# Patient Record
Sex: MALE | Race: BLACK OR AFRICAN AMERICAN | NOT HISPANIC OR LATINO | Employment: FULL TIME | RURAL
[De-identification: names, ages, dates, MRNs, and addresses within clinical notes are randomized per-mention and may not be internally consistent; named-entity substitution may affect disease eponyms.]

---

## 2021-04-03 ENCOUNTER — HISTORICAL (OUTPATIENT)
Dept: ADMINISTRATIVE | Facility: HOSPITAL | Age: 26
End: 2021-04-03

## 2021-04-03 ENCOUNTER — HOSPITAL ENCOUNTER (EMERGENCY)
Facility: HOSPITAL | Age: 26
Discharge: HOME | End: 2021-04-03
Attending: EMERGENCY MEDICINE
Payer: COMMERCIAL

## 2021-04-03 VITALS
WEIGHT: 145 LBS | HEART RATE: 66 BPM | RESPIRATION RATE: 13 BRPM | BODY MASS INDEX: 21.98 KG/M2 | OXYGEN SATURATION: 100 % | HEIGHT: 68 IN | DIASTOLIC BLOOD PRESSURE: 61 MMHG | SYSTOLIC BLOOD PRESSURE: 102 MMHG | TEMPERATURE: 97 F

## 2021-04-03 DIAGNOSIS — S43.014A ANTERIOR DISLOCATION OF RIGHT SHOULDER, INITIAL ENCOUNTER: Primary | ICD-10-CM

## 2021-04-03 DIAGNOSIS — R52 PAIN: ICD-10-CM

## 2021-04-03 PROCEDURE — 96375 TX/PRO/DX INJ NEW DRUG ADDON: CPT

## 2021-04-03 PROCEDURE — 99283 EMERGENCY DEPT VISIT LOW MDM: CPT | Mod: 25

## 2021-04-03 PROCEDURE — 25000003 PHARM REV CODE 250

## 2021-04-03 PROCEDURE — 99284 EMERGENCY DEPT VISIT MOD MDM: CPT | Mod: 25,,, | Performed by: EMERGENCY MEDICINE

## 2021-04-03 PROCEDURE — 23650 CLTX SHO DSLC W/MNPJ WO ANES: CPT

## 2021-04-03 PROCEDURE — 63600175 PHARM REV CODE 636 W HCPCS: Performed by: EMERGENCY MEDICINE

## 2021-04-03 PROCEDURE — 23650 CLTX SHO DSLC W/MNPJ WO ANES: CPT | Mod: RT,,, | Performed by: EMERGENCY MEDICINE

## 2021-04-03 PROCEDURE — 96374 THER/PROPH/DIAG INJ IV PUSH: CPT

## 2021-04-03 PROCEDURE — 23650 PR CLOSED RX SHLDR DISLOCATION: ICD-10-PCS | Mod: RT,,, | Performed by: EMERGENCY MEDICINE

## 2021-04-03 PROCEDURE — 99284 PR EMERGENCY DEPT VISIT,LEVEL IV: ICD-10-PCS | Mod: 25,,, | Performed by: EMERGENCY MEDICINE

## 2021-04-03 PROCEDURE — 96361 HYDRATE IV INFUSION ADD-ON: CPT

## 2021-04-03 RX ORDER — MIDAZOLAM HYDROCHLORIDE 1 MG/ML
2 INJECTION INTRAMUSCULAR; INTRAVENOUS
Status: COMPLETED | OUTPATIENT
Start: 2021-04-03 | End: 2021-04-03

## 2021-04-03 RX ORDER — FENTANYL CITRATE 50 UG/ML
100 INJECTION, SOLUTION INTRAMUSCULAR; INTRAVENOUS
Status: COMPLETED | OUTPATIENT
Start: 2021-04-03 | End: 2021-04-03

## 2021-04-03 RX ORDER — MIDAZOLAM HYDROCHLORIDE 1 MG/ML
INJECTION INTRAMUSCULAR; INTRAVENOUS
Status: DISCONTINUED
Start: 2021-04-03 | End: 2021-04-03 | Stop reason: HOSPADM

## 2021-04-03 RX ORDER — SODIUM CHLORIDE 9 MG/ML
INJECTION, SOLUTION INTRAVENOUS
Status: COMPLETED
Start: 2021-04-03 | End: 2021-04-03

## 2021-04-03 RX ADMIN — SODIUM CHLORIDE 1000 ML: 9 INJECTION, SOLUTION INTRAVENOUS at 10:04

## 2021-04-03 RX ADMIN — MIDAZOLAM HYDROCHLORIDE 7 MG: 1 INJECTION, SOLUTION INTRAMUSCULAR; INTRAVENOUS at 10:04

## 2021-04-03 RX ADMIN — FENTANYL CITRATE 100 MCG: 50 INJECTION INTRAMUSCULAR; INTRAVENOUS at 10:04

## 2022-02-10 ENCOUNTER — HOSPITAL ENCOUNTER (EMERGENCY)
Facility: HOSPITAL | Age: 27
Discharge: HOME | End: 2022-02-10
Attending: EMERGENCY MEDICINE

## 2022-02-10 ENCOUNTER — HOSPITAL ENCOUNTER (EMERGENCY)
Facility: HOSPITAL | Age: 27
Discharge: HOME | End: 2022-02-10

## 2022-02-10 VITALS
SYSTOLIC BLOOD PRESSURE: 117 MMHG | HEART RATE: 58 BPM | WEIGHT: 130 LBS | HEIGHT: 68 IN | RESPIRATION RATE: 17 BRPM | BODY MASS INDEX: 19.7 KG/M2 | OXYGEN SATURATION: 100 % | DIASTOLIC BLOOD PRESSURE: 76 MMHG | TEMPERATURE: 98 F

## 2022-02-10 VITALS
HEART RATE: 60 BPM | SYSTOLIC BLOOD PRESSURE: 112 MMHG | TEMPERATURE: 98 F | OXYGEN SATURATION: 99 % | HEIGHT: 68 IN | RESPIRATION RATE: 16 BRPM | WEIGHT: 130 LBS | DIASTOLIC BLOOD PRESSURE: 66 MMHG | BODY MASS INDEX: 19.7 KG/M2

## 2022-02-10 DIAGNOSIS — M24.411 SHOULDER DISLOCATION, RECURRENT, RIGHT: ICD-10-CM

## 2022-02-10 DIAGNOSIS — S43.014A ANTERIOR DISLOCATION OF RIGHT SHOULDER, INITIAL ENCOUNTER: Primary | ICD-10-CM

## 2022-02-10 PROCEDURE — 63600175 PHARM REV CODE 636 W HCPCS: Performed by: NURSE PRACTITIONER

## 2022-02-10 PROCEDURE — 96361 HYDRATE IV INFUSION ADD-ON: CPT

## 2022-02-10 PROCEDURE — 23650 PR CLOSED RX SHLDR DISLOCATION: ICD-10-PCS | Mod: RT,,, | Performed by: EMERGENCY MEDICINE

## 2022-02-10 PROCEDURE — 99285 EMERGENCY DEPT VISIT HI MDM: CPT | Mod: 25

## 2022-02-10 PROCEDURE — 96374 THER/PROPH/DIAG INJ IV PUSH: CPT

## 2022-02-10 PROCEDURE — 23650 CLTX SHO DSLC W/MNPJ WO ANES: CPT | Mod: 52,RT,, | Performed by: NURSE PRACTITIONER

## 2022-02-10 PROCEDURE — 63600175 PHARM REV CODE 636 W HCPCS: Performed by: EMERGENCY MEDICINE

## 2022-02-10 PROCEDURE — 99283 EMERGENCY DEPT VISIT LOW MDM: CPT | Mod: 57,,, | Performed by: EMERGENCY MEDICINE

## 2022-02-10 PROCEDURE — 99284 EMERGENCY DEPT VISIT MOD MDM: CPT | Mod: 25,,, | Performed by: NURSE PRACTITIONER

## 2022-02-10 PROCEDURE — 99284 PR EMERGENCY DEPT VISIT,LEVEL IV: ICD-10-PCS | Mod: 25,,, | Performed by: NURSE PRACTITIONER

## 2022-02-10 PROCEDURE — 99283 PR EMERGENCY DEPT VISIT,LEVEL III: ICD-10-PCS | Mod: 57,,, | Performed by: EMERGENCY MEDICINE

## 2022-02-10 PROCEDURE — 96375 TX/PRO/DX INJ NEW DRUG ADDON: CPT

## 2022-02-10 PROCEDURE — 23650 PR CLOSED RX SHLDR DISLOCATION: ICD-10-PCS | Mod: 52,RT,, | Performed by: NURSE PRACTITIONER

## 2022-02-10 PROCEDURE — 25000003 PHARM REV CODE 250: Performed by: EMERGENCY MEDICINE

## 2022-02-10 PROCEDURE — 99284 EMERGENCY DEPT VISIT MOD MDM: CPT | Mod: 25

## 2022-02-10 PROCEDURE — 23650 CLTX SHO DSLC W/MNPJ WO ANES: CPT | Mod: RT,,, | Performed by: EMERGENCY MEDICINE

## 2022-02-10 RX ORDER — FENTANYL CITRATE 50 UG/ML
50 INJECTION, SOLUTION INTRAMUSCULAR; INTRAVENOUS
Status: COMPLETED | OUTPATIENT
Start: 2022-02-10 | End: 2022-02-10

## 2022-02-10 RX ORDER — MIDAZOLAM HYDROCHLORIDE 1 MG/ML
2 INJECTION INTRAMUSCULAR; INTRAVENOUS
Status: COMPLETED | OUTPATIENT
Start: 2022-02-10 | End: 2022-02-10

## 2022-02-10 RX ORDER — SODIUM CHLORIDE 9 MG/ML
INJECTION, SOLUTION INTRAVENOUS
Status: COMPLETED | OUTPATIENT
Start: 2022-02-10 | End: 2022-02-10

## 2022-02-10 RX ORDER — PROPOFOL 10 MG/ML
VIAL (ML) INTRAVENOUS CODE/TRAUMA/SEDATION MEDICATION
Status: COMPLETED | OUTPATIENT
Start: 2022-02-10 | End: 2022-02-10

## 2022-02-10 RX ORDER — PROPOFOL 10 MG/ML
200 VIAL (ML) INTRAVENOUS ONCE
Status: COMPLETED | OUTPATIENT
Start: 2022-02-10 | End: 2022-02-10

## 2022-02-10 RX ORDER — SODIUM CHLORIDE 9 MG/ML
INJECTION, SOLUTION INTRAVENOUS
Status: DISCONTINUED
Start: 2022-02-10 | End: 2022-02-10 | Stop reason: HOSPADM

## 2022-02-10 RX ADMIN — PROPOFOL 50 MG: 10 INJECTION, EMULSION INTRAVENOUS at 02:02

## 2022-02-10 RX ADMIN — FENTANYL CITRATE 50 MCG: 50 INJECTION INTRAMUSCULAR; INTRAVENOUS at 11:02

## 2022-02-10 RX ADMIN — SODIUM CHLORIDE 1000 ML: 9 INJECTION, SOLUTION INTRAVENOUS at 02:02

## 2022-02-10 RX ADMIN — PROPOFOL 20 MG: 10 INJECTION, EMULSION INTRAVENOUS at 02:02

## 2022-02-10 RX ADMIN — MIDAZOLAM 2 MG: 1 INJECTION INTRAMUSCULAR; INTRAVENOUS at 11:02

## 2022-02-10 RX ADMIN — PROPOFOL 100 MG: 10 INJECTION, EMULSION INTRAVENOUS at 02:02

## 2022-02-10 RX ADMIN — PROPOFOL 30 MG: 10 INJECTION, EMULSION INTRAVENOUS at 02:02

## 2022-02-10 NOTE — ED TRIAGE NOTES
Transfer from [facility] with right shoulder dislocation that they were unable to relocate there.  Fentanyl 200mcg, and versed 4mg admin. pta

## 2022-02-10 NOTE — ED PROVIDER NOTES
Encounter Date: 2/10/2022       History     Chief Complaint   Patient presents with    Shoulder Injury     Reports right shoulder pain onset just PTA. Was shoveling when pain began. Recent hx of dislocation. Multiple dislocations over the past year. Has not seen ortho. Denies paresthesia of the RUE.        Review of patient's allergies indicates:  No Known Allergies  History reviewed. No pertinent past medical history.  History reviewed. No pertinent surgical history.  History reviewed. No pertinent family history.  Social History     Tobacco Use    Smoking status: Current Every Day Smoker     Types: Cigarettes    Smokeless tobacco: Never Used   Substance Use Topics    Alcohol use: Never    Drug use: Never     Review of Systems   Respiratory: Negative.    Cardiovascular: Negative.    Musculoskeletal: Positive for arthralgias (right shoulder).   Neurological: Negative.        Physical Exam     Initial Vitals [02/10/22 1034]   BP Pulse Resp Temp SpO2   127/71 79 18 98.2 °F (36.8 °C) 99 %      MAP       --         Physical Exam    Nursing note and vitals reviewed.  Constitutional: No distress.   HENT:   Head: Normocephalic and atraumatic.   Eyes: EOM are normal.   Neck: Neck supple.   Cardiovascular: Normal rate.   Pulmonary/Chest: No respiratory distress.   Musculoskeletal:      Cervical back: Neck supple.      Comments: Asymmetry of right shoulder. Questionable deformity. 3+ rp, cap refill brisk, sensorium intact.     Neurological: He is alert. GCS score is 15. GCS eye subscore is 4. GCS verbal subscore is 5. GCS motor subscore is 6.   Skin: Skin is warm and dry. Capillary refill takes less than 2 seconds.         Medical Screening Exam   See Full Note    ED Course   Procedural Sedation        Date/Time: 2/10/2022 11:15 AM  Performed by: DANNIE Peña  Authorized by: DANNIE Peña   Consent Done: Yes  Consent: Written consent obtained.  Risks and benefits: risks, benefits and alternatives were  "discussed  Consent given by: patient  Patient understanding: patient states understanding of the procedure being performed  Patient consent: the patient's understanding of the procedure matches consent given  Imaging studies: imaging studies available  Patient identity confirmed: name and   Time out: Immediately prior to procedure a "time out" was called to verify the correct patient, procedure, equipment, support staff and site/side marked as required.  ASA Class: Class 1 - Heathy patient. No medical history.  Mallampati Score: Class 2 - Visualization of the soft palate, fauces, and uvula.   Equipment: on cardiac monitor., on BP monitor., suction available., airway equipment available. and on supplemental oxygen.     Sedation type: moderate (conscious) sedation  (See MAR for exact dosages of medications).  Sedatives: midazolam  Analgesia: fentanyl  Sedation start date/time: 2/10/2022 11:31 AM  Sedation end date/time: 2/10/2022 11:39 AM  Total Sedation Time (min): 8  Complications: No complications.   Comments: Unsuccessful at reduction with moderate sedation  Patient/Family history of anesthesia or sedation complications: No      Labs Reviewed - No data to display       Imaging Results          X-ray Shoulder 2 or More Views Right (Final result)  Result time 02/10/22 11:04:08    Final result by Perry Oliver II, MD (02/10/22 11:04:08)                 Impression:      Shoulder dislocation as described above.      Electronically signed by: Perry Oliver  Date:    02/10/2022  Time:    11:04             Narrative:    EXAMINATION:  XR SHOULDER COMPLETE 2 OR MORE VIEWS RIGHT    CLINICAL HISTORY:  right shoulder pain;    COMPARISON:  3 April 2021    FINDINGS:  No evidence of fracture seen.  Humerus is dislocated anteriorly to the glenoid.  No degenerative change is present.  No soft tissue abnormality is seen.                                 Medications   fentaNYL injection 50 mcg (50 mcg Intravenous Given " 2/10/22 1117)   midazolam (VERSED) 1 mg/mL injection 2 mg (2 mg Intravenous Given by Other 2/10/22 1131)   fentaNYL injection 50 mcg (50 mcg Intravenous Given 2/10/22 1131)   midazolam (VERSED) 1 mg/mL injection 2 mg (2 mg Intravenous Given 2/10/22 1138)   fentaNYL injection 50 mcg (50 mcg Intravenous Given by Other 2/10/22 1139)   fentaNYL injection 50 mcg (50 mcg Intravenous Given 2/10/22 1145)                 ED Course as of 02/10/22 1235   Thu Feb 10, 2022   1120 Anterior shoulder dislocation on xray. Plan to give fentanyl for pain and reduce. [GM]   1213 Unsuccessful at shoulder reduction. Discussed with Dr Rosales. Advised transfer to [facility] ER. Dr Edmonds accepting. [GM]      ED Course User Index  [GM] DANNIE Peña          Clinical Impression:   Final diagnoses:  [S43.014A] Anterior dislocation of right shoulder, initial encounter (Primary)          ED Disposition Condition    Transfer to Another Facility Stable              DANNIE Peña  02/10/22 1235

## 2022-02-10 NOTE — ED PROVIDER NOTES
Encounter Date: 2/10/2022    SCRIBE #1 NOTE: I, Rip Morales, am scribing for, and in the presence of,  Dr. Joss Lopez. I have scribed the entire note.   SCRIBE #2 NOTE: I, Carmen, am scribing for, and in the presence of, Grey.     History     Chief Complaint   Patient presents with    Shoulder Injury     A 26 year old  male, with no known PMH, presents to the emergency department, via EMS from [facility], with complaints of right shoulder injury. Patient reports that this morning, February 10, 2022,  he was moving some lumber and his right shoulder popped out of place. Patient explains that he has a chronic reoccurrence of right shoulder dislocations (approximately 3-4x), but has not seen an orthopedist about this problem. Patient presented to [facility] ED this morning to have his right shoulder reduced. He was administered 200 mcg Fentanyl and 4 mg of versed; however, the right shoulder was unable to be reduced due to patient's pain. Upon presentation to [facility] ED, patient rates his pain as 9/10 on the pain scale; patient's right arm is in a sling. Patient denies any numbness or any other associated injuries/symptoms.    The history is provided by the patient. No  was used.     Review of patient's allergies indicates:  No Known Allergies  History reviewed. No pertinent past medical history.  History reviewed. No pertinent surgical history.  History reviewed. No pertinent family history.  Social History     Tobacco Use    Smoking status: Current Every Day Smoker     Types: Cigarettes    Smokeless tobacco: Never Used   Substance Use Topics    Alcohol use: Never    Drug use: Never     Review of Systems   Musculoskeletal: Positive for arthralgias (Right shoulder).   Neurological: Negative for numbness.   All other systems reviewed and are negative.      Physical Exam     Initial Vitals [02/10/22 1402]   BP Pulse Resp Temp SpO2   121/63 61 16 97.9 °F (36.6 °C) 100 %       MAP       --         Physical Exam    Nursing note and vitals reviewed.  Constitutional: He appears well-developed and well-nourished.   HENT:   Head: Normocephalic and atraumatic.   Eyes: Conjunctivae and EOM are normal. Pupils are equal, round, and reactive to light.   Neck: Neck supple.   Normal range of motion.  Cardiovascular: Normal rate, regular rhythm, normal heart sounds and intact distal pulses.   Pulmonary/Chest: Breath sounds normal. No respiratory distress.   Abdominal: Abdomen is soft. He exhibits no distension. There is no abdominal tenderness.   Musculoskeletal:      Right shoulder: Deformity and tenderness present. No crepitus. Normal pulse.      Cervical back: Normal range of motion and neck supple.      Comments: Patient's right arm is in a sling. Right shoulder appears to be anteriorly dislocated. Right shoulder is tender to palpation.     Neurological: He is alert and oriented to person, place, and time. GCS score is 15. GCS eye subscore is 4. GCS verbal subscore is 5. GCS motor subscore is 6.   Patient's right shoulder is neurovascularly intact.   Skin: Skin is warm and dry. Capillary refill takes less than 2 seconds. No rash noted.   Psychiatric: He has a normal mood and affect. Thought content normal.         ED Course   Orthopedic Injury    Date/Time: 2/10/2022 2:17 PM  Performed by: Joss Lopez MD  Authorized by: Janes Edmonds MD     Location procedure was performed:  [facility] [[email or web address] EMERGENCY DEPARTMENT  Assisting Provider:  Janes Edmonds MD  Consent Done?:  Yes  Universal Protocol:     Verbal consent obtained?: Yes      Written consent obtained?: Yes      Risks and benefits: Risks, benefits and alternatives were discussed      Consent given by:  Patient    Patient states understanding of procedure being performed: Yes      Patient's understanding of procedure matches consent: Yes      Procedure consent matches procedure scheduled: Yes      Relevant documents present and  verified: Yes      Site marked: Yes      Imaging studies available: Yes      Required items: Required blood products, implants, devices and special equipment avialable      Patient identity confirmed:   and name    Time Out: Immediately prior to the procedure a time out was called    Injury:     Injury location:  Shoulder    Location details:  Right shoulder    Injury type:  Dislocation    Dislocation type: anterior      Chronicity:  Recurrent    Hill-Sachs deformity?: No        Pre-procedure assessment:     Neurovascular status: Neurovascularly intact      Distal perfusion: normal      Neurological function: normal      Range of motion: reduced      Local anesthesia used?: No      Patient sedated?: Yes      ASA Class:  Class 1 - Heathy patient. No medical history.    Mallampati Score:  Class 2 - Visualization of the soft palate, fauces, and uvula.  Date/Time of last solid:  2/10/2022 6:00 AM    Date/Time of last fluid:  2/10/2022 6:00 AM    Patient/Family history of anesthesia or sedation complications: No      Sedation type: deep sedation      Sedation:  Propofol    Sedation start:  2/10/2022 2:47 PM    Sedation end:  2/10/2022 3:00 PM    Vital signs: Vital signs monitored during sedation        Selections made in this section will also lock the Injury type section above.:     Manipulation performed?: Yes      Reduction method:  External rotation (Longitudinal Traction)    Reduction method:  External rotation (Longitudinal Traction)    Reduction method:  External rotation (Longitudinal Traction)    Reduction method:  External rotation (Longitudinal Traction)    Reduction method:  External rotation (Longitudinal Traction)    Reduction method:  External rotation (Longitudinal Traction)    Reduction successful?: Yes      Confirmation: Reduction confirmed by x-ray      Immobilization:  Sling (Shoulder Immobilizer)    Complications: No      Specimens: No      Implants: No    Post-procedure assessment:      Neurovascular status: Neurovascularly intact      Distal perfusion: normal      Neurological function: normal      Range of motion: improved      Patient tolerance:  Patient tolerated the procedure well with no immediate complications     Sedation performed by Janes Edmonds MD. Reduction performed by Joss Lopez MD.      Labs Reviewed - No data to display       Imaging Results          X-Ray Shoulder 1 View Right (Final result)  Result time 02/10/22 15:07:58    Final result by Arias Monroy MD (02/10/22 15:07:58)                 Impression:      As above      Electronically signed by: Arias Monroy  Date:    02/10/2022  Time:    15:07             Narrative:    EXAMINATION:  XR SHOULDER 1 VIEW RIGHT    CLINICAL HISTORY:  Recurrent dislocation, right shoulder    TECHNIQUE:  AP view right shoulder    COMPARISON:  Earlier today    FINDINGS:  Reduction of the right shoulder dislocation.  There is a deformity of the posterolateral right humeral head.                                 Medications   sodium chloride 0.9% infusion (has no administration in time range)   propofol (DIPRIVAN) 10 mg/mL IVP (100 mg Intravenous Given 2/10/22 1447)   propofol (DIPRIVAN) 10 mg/mL IVP (30 mg Intravenous Given 2/10/22 1449)   0.9%  NaCl infusion ( Intravenous Stopped 2/10/22 1538)        Additional MDM:   Sedation - Procedural: Procedural sedation was done for: dislocation. The mallampati and ASA score were documented prior to sedation (see procedure note). Medications given: Propofol. The patient had no complications and tolerated the procedural sedation well.        Scribe Attestation:   Scribe #1: I performed the above scribed service and the documentation accurately describes the services I performed. I attest to the accuracy of the note.    Attending Attestation:   Physician Attestation Statement for Resident:  As the supervising MD   Physician Attestation Statement: I have personally seen and examined this patient.   I agree with  the above history. -:   As the supervising MD I agree with the above PE.    As the supervising MD I agree with the above treatment, course, plan, and disposition.          Physician Attestation for Scribe:  Physician Attestation Statement for Scribe #1: IJohn, reviewed documentation, as scribed by Carmen in my presence, and it is both accurate and complete.   Physician Attestation Statement for Scribe #2: IJohn, reviewed documentation, as scribed by Grey in my presence, and it is both accurate and complete. I also acknowledge and confirm the content of the note done by Glenny #1.          ED Course as of 02/10/22 1546   Thu Feb 10, 2022   1459 Patient evaluated and managed by attending emergency physician and the resident physician.  Patient was transferred here due to a recurrent anterior shoulder dislocation that recurred again today when he was doing some manual labor.  X-rays showed anterior dislocation.  No other associated injury.  Pain was moderate.  Physical exam showed radius ulna median nerves are intact with obvious deformity of the right shoulder.  Heart lung exam is unremarkable.  Patient underwent procedural sedation by Dr. Grace with propofol all.  Right shoulder was reduced by Dr. Lopez.  There were no complications.  No hypoxia.  No hypotension.  Patient has recovered well.  Is in a shoulder immobilizer and will have ambulatory referral to orthopedist. [PK]      ED Course User Index  [PK] Janes Edmonds MD             Clinical Impression:   Final diagnoses:  [S43.014A] Anterior dislocation of right shoulder, initial encounter (Primary)          ED Disposition Condition    Discharge Stable        ED Prescriptions     None        Follow-up Information     Follow up With Specialties Details Why Contact Info    Abelino Rosales MD Orthopedic Surgery   [address]  [address]  Abelino Rosales Md, Orthopedic & Sports Medicine, [facility] [address]  [phone]             Janes MG  MD Grey  02/10/22 1541

## 2022-02-10 NOTE — ED TRIAGE NOTES
Pt presents to ed complaining of right shoulder pain after injuring it about 20 minutes ago. Pt states has history of shoulder dislocation and was supposed to have surgery but couldn't miss work to have operation.

## 2023-01-08 ENCOUNTER — HOSPITAL ENCOUNTER (EMERGENCY)
Facility: HOSPITAL | Age: 28
Discharge: HOME | End: 2023-01-08
Attending: EMERGENCY MEDICINE

## 2023-01-08 VITALS
HEART RATE: 54 BPM | WEIGHT: 130 LBS | OXYGEN SATURATION: 99 % | HEIGHT: 68 IN | TEMPERATURE: 98 F | RESPIRATION RATE: 18 BRPM | SYSTOLIC BLOOD PRESSURE: 112 MMHG | DIASTOLIC BLOOD PRESSURE: 67 MMHG | BODY MASS INDEX: 19.7 KG/M2

## 2023-01-08 DIAGNOSIS — S43.004A SHOULDER DISLOCATION, RIGHT, INITIAL ENCOUNTER: Primary | ICD-10-CM

## 2023-01-08 DIAGNOSIS — S49.90XA SHOULDER INJURY: ICD-10-CM

## 2023-01-08 DIAGNOSIS — T14.90XA INJURY: ICD-10-CM

## 2023-01-08 PROCEDURE — 23655 PR CLOSED RX SHLDR DISLOC,ANESTHESIA: ICD-10-PCS | Mod: 54,RT,, | Performed by: EMERGENCY MEDICINE

## 2023-01-08 PROCEDURE — 99283 EMERGENCY DEPT VISIT LOW MDM: CPT | Mod: 25,,, | Performed by: EMERGENCY MEDICINE

## 2023-01-08 PROCEDURE — 23655 CLTX SHO DSLC W/MNPJ W/ANES: CPT | Mod: RT

## 2023-01-08 PROCEDURE — 63600175 PHARM REV CODE 636 W HCPCS: Performed by: EMERGENCY MEDICINE

## 2023-01-08 PROCEDURE — 99283 PR EMERGENCY DEPT VISIT,LEVEL III: ICD-10-PCS | Mod: 25,,, | Performed by: EMERGENCY MEDICINE

## 2023-01-08 PROCEDURE — 96374 THER/PROPH/DIAG INJ IV PUSH: CPT

## 2023-01-08 PROCEDURE — 99285 EMERGENCY DEPT VISIT HI MDM: CPT | Mod: 25

## 2023-01-08 PROCEDURE — 96375 TX/PRO/DX INJ NEW DRUG ADDON: CPT

## 2023-01-08 PROCEDURE — 23655 CLTX SHO DSLC W/MNPJ W/ANES: CPT | Mod: 54,RT,, | Performed by: EMERGENCY MEDICINE

## 2023-01-08 PROCEDURE — 25000003 PHARM REV CODE 250: Performed by: EMERGENCY MEDICINE

## 2023-01-08 RX ORDER — ONDANSETRON 2 MG/ML
4 INJECTION INTRAMUSCULAR; INTRAVENOUS
Status: COMPLETED | OUTPATIENT
Start: 2023-01-08 | End: 2023-01-08

## 2023-01-08 RX ORDER — PROPOFOL 10 MG/ML
100 VIAL (ML) INTRAVENOUS ONCE
Status: COMPLETED | OUTPATIENT
Start: 2023-01-08 | End: 2023-01-08

## 2023-01-08 RX ORDER — HYDROMORPHONE HYDROCHLORIDE 2 MG/ML
1 INJECTION, SOLUTION INTRAMUSCULAR; INTRAVENOUS; SUBCUTANEOUS
Status: COMPLETED | OUTPATIENT
Start: 2023-01-08 | End: 2023-01-08

## 2023-01-08 RX ADMIN — PROPOFOL 70 MG: 10 INJECTION, EMULSION INTRAVENOUS at 03:01

## 2023-01-08 RX ADMIN — HYDROMORPHONE HYDROCHLORIDE 1 MG: 2 INJECTION, SOLUTION INTRAMUSCULAR; INTRAVENOUS; SUBCUTANEOUS at 02:01

## 2023-01-08 RX ADMIN — SODIUM CHLORIDE 1000 ML: 9 INJECTION, SOLUTION INTRAVENOUS at 03:01

## 2023-01-08 RX ADMIN — ONDANSETRON HYDROCHLORIDE 4 MG: 2 SOLUTION INTRAMUSCULAR; INTRAVENOUS at 02:01

## 2023-01-08 NOTE — ED NOTES
Dr. Logan and Dr. Cervantes at bedside. Pt sedated. Shoulder reduced per Dr. Logan. Pt tolerated well. Shoulder immobilizer in place.

## 2023-01-08 NOTE — ED PROVIDER NOTES
Encounter Date: 1/8/2023    SCRIBE #1 NOTE: I, Janki Gavin, am scribing for, and in the presence of,  Yousuf Logan MD. I have scribed the entire note.     History     Chief Complaint   Patient presents with    Shoulder Pain     Pt states he was asleep and rolled over and injured right shoulder - pt states right shoulder is dislocated     27 y.o. male presents to the ED with complaints of right shoulder pain. Patient stated he was asleep and rolled over. When patient rolled over, he dislocated his shoulder. Patient stated he has dislocated his shoulder 3 times.     The history is provided by the patient. No  was used.   Review of patient's allergies indicates:  No Known Allergies  History reviewed. No pertinent past medical history.  History reviewed. No pertinent surgical history.  History reviewed. No pertinent family history.  Social History     Tobacco Use    Smoking status: Former     Types: Cigarettes    Smokeless tobacco: Never   Substance Use Topics    Alcohol use: Never    Drug use: Never     Review of Systems   Constitutional: Negative.    HENT: Negative.     Eyes: Negative.    Respiratory: Negative.     Cardiovascular: Negative.    Gastrointestinal: Negative.    Endocrine: Negative.    Genitourinary: Negative.    Musculoskeletal:         Right shoulder dislocation   Skin: Negative.    Allergic/Immunologic: Negative.    Neurological: Negative.    Hematological: Negative.    Psychiatric/Behavioral: Negative.     All other systems reviewed and are negative.    Physical Exam     Initial Vitals   BP Pulse Resp Temp SpO2   01/08/23 0024 01/08/23 0023 01/08/23 0023 01/08/23 0023 01/08/23 0023   121/82 70 18 97.9 °F (36.6 °C) 98 %      MAP       --                Physical Exam    Nursing note and vitals reviewed.  Constitutional: He appears well-developed and well-nourished.   Musculoskeletal:      Right shoulder: Deformity and tenderness present. Decreased range of motion.      Neurological: He is alert and oriented to person, place, and time.   Skin: Skin is warm.       ED Course   Orthopedic Injury    Date/Time: 1/8/2023 3:12 AM  Performed by: Yousuf Logan MD  Authorized by: Yousuf Logan MD     Location procedure was performed:  [facility] EMERGENCY DEPARTMENT  Injury:     Injury location:  Shoulder    Location details:  Right shoulder    Injury type:  Dislocation    Dislocation type: anterior      Chronicity:  Recurrent    Hill-Sachs deformity?: No        Pre-procedure assessment:     Neurovascular status: Neurovascularly intact      Distal perfusion: normal      Neurological function: normal      Range of motion: reduced      Local anesthesia used?: No      Patient sedated?: Yes      ASA Class:  Class 1 - Heathy patient. No medical history.    Mallampati Score:  Class 1 - Visualization of the soft palate, fauces, uvula, and anterior/posterior pillars.  Date/Time of last solid:  1/7/2023 10:13 PM    Date/Time of last fluid:  1/7/2023 10:13 PM    Patient/Family history of anesthesia or sedation complications: No      Sedation type: deep sedation      Sedation:  Propofol    Analgesia:  Hydromorphone    Sedation start:  1/8/2023 3:05 AM    Sedation end:  1/8/2023 3:14 AM    Vital signs: Vital signs monitored during sedation        Selections made in this section will also lock the Injury type section above.:     Manipulation performed?: Yes      Reduction method:  Traction and counter traction    Reduction method:  Traction and counter traction    Reduction method:  Traction and counter traction    Reduction method:  Traction and counter traction    Reduction method:  Traction and counter traction    Reduction method:  Traction and counter traction    Reduction successful?: Yes      Confirmation: Reduction confirmed by x-ray      Immobilization:  Sling    Complications: No      Estimated blood loss (mL):  0    Specimens: No      Implants: No    Post-procedure assessment:      Neurovascular status: Neurovascularly intact      Distal perfusion: normal      Neurological function: normal      Range of motion: normal      Patient tolerance:  Patient tolerated the procedure well with no immediate complications  Labs Reviewed - No data to display       Imaging Results              X-Ray Shoulder Complete 2 View Right (In process)                      Medications   sodium chloride 0.9% bolus 1,000 mL 1,000 mL (1,000 mLs Intravenous New Bag 1/8/23 0305)   HYDROmorphone (PF) injection 1 mg (1 mg Intravenous Given 1/8/23 0218)   ondansetron injection 4 mg (4 mg Intravenous Given 1/8/23 0218)   propofol (DIPRIVAN) 10 mg/mL IVP (70 mg Intravenous Given by Provider 1/8/23 0305)     Medical Decision Making:   Initial Assessment:   ROLLED OVER IN BED AND THINKS HE DISLOCATED SHOULDER ON RIGHT.  THIS IS A RECURRENT ISSUE  Differential Diagnosis:   CONTUSION VS SPRAIN VS DISLOCATION  Clinical Tests:   Radiological Study: Ordered and Reviewed  ED Management:  CLOSED REDUCTION IN ED UNDER DEEP SEDATION          Attending Attestation:           Physician Attestation for Scribe:  Physician Attestation Statement for Scribe #1: I, Yousuf Logan MD, reviewed documentation, as scribed by Janki Gavin in my presence, and it is both accurate and complete.                        Clinical Impression:   Final diagnoses:  [S49.90XA] Shoulder injury  [T14.90XA] Injury  [S43.004A] Shoulder dislocation, right, initial encounter (Primary)        ED Disposition Condition    Discharge Stable          ED Prescriptions    None       Follow-up Information       Follow up With Specialties Details Why Contact Info    [facility] PROVIDER   As needed              Yousuf Logan MD  01/08/23 2413

## 2023-01-08 NOTE — DISCHARGE INSTRUCTIONS
FOLLOW UP WITH ORTHOPEDIC SURGERY, DR STARR.  WEAR SHOULDER IMMOBILIZER PENDING FOLLOW UP WITH ORTHOPEDIC SURGERY OR 1 WEEK.  RETURN TO THE EMERGENCY DEPARTMENT AS NEEDED.